# Patient Record
Sex: MALE | URBAN - METROPOLITAN AREA
[De-identification: names, ages, dates, MRNs, and addresses within clinical notes are randomized per-mention and may not be internally consistent; named-entity substitution may affect disease eponyms.]

---

## 2021-10-20 ENCOUNTER — APPOINTMENT (OUTPATIENT)
Age: 31
Setting detail: DERMATOLOGY
End: 2021-10-20

## 2021-10-20 DIAGNOSIS — L21.8 OTHER SEBORRHEIC DERMATITIS: ICD-10-CM

## 2021-10-20 DIAGNOSIS — Z12.83 ENCOUNTER FOR SCREENING FOR MALIGNANT NEOPLASM OF SKIN: ICD-10-CM

## 2021-10-20 DIAGNOSIS — D69.2 OTHER NONTHROMBOCYTOPENIC PURPURA: ICD-10-CM

## 2021-10-20 DIAGNOSIS — D22 MELANOCYTIC NEVI: ICD-10-CM

## 2021-10-20 PROBLEM — D22.5 MELANOCYTIC NEVI OF TRUNK: Status: ACTIVE | Noted: 2021-10-20

## 2021-10-20 PROCEDURE — OTHER PRESCRIPTION: OTHER

## 2021-10-20 PROCEDURE — OTHER EDUCATIONAL RESOURCES PROVIDED: OTHER

## 2021-10-20 PROCEDURE — OTHER COUNSELING: OTHER

## 2021-10-20 PROCEDURE — OTHER PRESCRIPTION MEDICATION MANAGEMENT: OTHER

## 2021-10-20 PROCEDURE — 99203 OFFICE O/P NEW LOW 30 MIN: CPT

## 2021-10-20 RX ORDER — TACROLIMUS 1 MG/G
OINTMENT TOPICAL BID
Qty: 30 | Refills: 2 | Status: ERX | COMMUNITY
Start: 2021-10-20

## 2021-10-20 ASSESSMENT — LOCATION DETAILED DESCRIPTION DERM
LOCATION DETAILED: RIGHT SUPERIOR HELIX
LOCATION DETAILED: STERNUM
LOCATION DETAILED: RIGHT CENTRAL POSTAURICULAR SKIN
LOCATION DETAILED: LEFT SUPERIOR POSTAURICULAR SKIN

## 2021-10-20 ASSESSMENT — LOCATION SIMPLE DESCRIPTION DERM
LOCATION SIMPLE: RIGHT EAR
LOCATION SIMPLE: SCALP
LOCATION SIMPLE: CHEST

## 2021-10-20 ASSESSMENT — LOCATION ZONE DERM
LOCATION ZONE: SCALP
LOCATION ZONE: TRUNK
LOCATION ZONE: EAR

## 2021-10-20 NOTE — PROCEDURE: PRESCRIPTION MEDICATION MANAGEMENT
Render In Strict Bullet Format?: No
Initiate Treatment: tacrolimus 0.1 % topical ointment BID
Detail Level: Simple

## 2021-10-20 NOTE — PROCEDURE: COUNSELING
Detail Level: Generalized
Detail Level: Detailed
Detail Level: Simple
Patient Specific Counseling (Will Not Stick From Patient To Patient): \\nPt reports this has been present about 1 mos and uncertain what might have caused it.  I advised pt that a biopsy should be performed if does not fully resolve to r/o malignant vascular neoplasm.  However, since it has not been present long, pt age, and risk of scarring, decided to monitor for 1 more mos to see if resolved.  Pt voiced understanding to rtc if doesn't resolve.

## 2021-11-17 ENCOUNTER — APPOINTMENT (OUTPATIENT)
Age: 31
Setting detail: DERMATOLOGY
End: 2021-11-17

## 2021-11-17 DIAGNOSIS — I78.8 OTHER DISEASES OF CAPILLARIES: ICD-10-CM

## 2021-11-17 PROCEDURE — OTHER OBSERVATION: OTHER

## 2021-11-17 PROCEDURE — OTHER OBSERVATION AND MEASURE: OTHER

## 2021-11-17 PROCEDURE — 99212 OFFICE O/P EST SF 10 MIN: CPT

## 2021-11-17 PROCEDURE — OTHER COUNSELING: OTHER

## 2021-11-17 ASSESSMENT — LOCATION ZONE DERM: LOCATION ZONE: EAR

## 2021-11-17 ASSESSMENT — LOCATION SIMPLE DESCRIPTION DERM: LOCATION SIMPLE: RIGHT EAR

## 2021-11-17 ASSESSMENT — LOCATION DETAILED DESCRIPTION DERM: LOCATION DETAILED: RIGHT SUPERIOR HELIX

## 2021-11-17 NOTE — HPI: SKIN LESION
How Severe Is Your Skin Lesion?: moderate
Has Your Skin Lesion Been Treated?: not been treated
Is This A New Presentation, Or A Follow-Up?: Skin Lesion
Additional History: The patient reports that he had this shot evaluated here on 10/20/2021 and was told if it didn’t heal we would biopsy the lesion.

## 2021-11-17 NOTE — PROCEDURE: COUNSELING
Patient Specific Counseling (Will Not Stick From Patient To Patient): -\\nPurpura resolved at this time. Given patient’s extensive family history of skin cancer, patient will return to clinic in 1 month to reassess. Will perform biopsy at that time if lesion persists.
Detail Level: Simple

## 2021-12-17 ENCOUNTER — APPOINTMENT (OUTPATIENT)
Age: 31
Setting detail: DERMATOLOGY
End: 2021-12-17

## 2021-12-17 DIAGNOSIS — I78.8 OTHER DISEASES OF CAPILLARIES: ICD-10-CM

## 2021-12-17 PROCEDURE — OTHER COUNSELING: OTHER

## 2021-12-17 PROCEDURE — 99212 OFFICE O/P EST SF 10 MIN: CPT

## 2021-12-17 PROCEDURE — OTHER OBSERVATION AND MEASURE: OTHER

## 2021-12-17 PROCEDURE — OTHER OBSERVATION: OTHER

## 2021-12-17 ASSESSMENT — LOCATION DETAILED DESCRIPTION DERM: LOCATION DETAILED: RIGHT SUPERIOR HELIX

## 2021-12-17 ASSESSMENT — LOCATION ZONE DERM: LOCATION ZONE: EAR

## 2021-12-17 ASSESSMENT — LOCATION SIMPLE DESCRIPTION DERM: LOCATION SIMPLE: RIGHT EAR

## 2021-12-17 NOTE — PROCEDURE: COUNSELING
Patient Specific Counseling (Will Not Stick From Patient To Patient): Appears to be resolving, no further tx needed.  RTC if any recurrence.  It is likely due to mask rubbbing on this area.
Detail Level: Simple